# Patient Record
Sex: MALE | Race: WHITE | Employment: OTHER | URBAN - METROPOLITAN AREA
[De-identification: names, ages, dates, MRNs, and addresses within clinical notes are randomized per-mention and may not be internally consistent; named-entity substitution may affect disease eponyms.]

---

## 2020-09-15 ENCOUNTER — APPOINTMENT (OUTPATIENT)
Dept: CT IMAGING | Age: 60
End: 2020-09-15
Payer: COMMERCIAL

## 2020-09-15 ENCOUNTER — HOSPITAL ENCOUNTER (EMERGENCY)
Age: 60
Discharge: HOME | End: 2020-09-15
Attending: EMERGENCY MEDICINE
Payer: COMMERCIAL

## 2020-09-15 VITALS
RESPIRATION RATE: 16 BRPM | WEIGHT: 242 LBS | TEMPERATURE: 97.8 F | OXYGEN SATURATION: 96 % | SYSTOLIC BLOOD PRESSURE: 138 MMHG | BODY MASS INDEX: 32.07 KG/M2 | DIASTOLIC BLOOD PRESSURE: 76 MMHG | HEIGHT: 73 IN | HEART RATE: 72 BPM

## 2020-09-15 LAB
CHP ED QC CHECK: YES
GLUCOSE BLD-MCNC: 185 MG/DL
METER GLUCOSE: 185 MG/DL (ref 74–99)

## 2020-09-15 PROCEDURE — 82962 GLUCOSE BLOOD TEST: CPT

## 2020-09-15 PROCEDURE — 70450 CT HEAD/BRAIN W/O DYE: CPT

## 2020-09-15 PROCEDURE — 6370000000 HC RX 637 (ALT 250 FOR IP): Performed by: EMERGENCY MEDICINE

## 2020-09-15 PROCEDURE — 99283 EMERGENCY DEPT VISIT LOW MDM: CPT

## 2020-09-15 RX ORDER — VALACYCLOVIR HYDROCHLORIDE 500 MG/1
1000 TABLET, FILM COATED ORAL ONCE
Status: COMPLETED | OUTPATIENT
Start: 2020-09-15 | End: 2020-09-15

## 2020-09-15 RX ORDER — CYCLOBENZAPRINE HCL 10 MG
10 TABLET ORAL 3 TIMES DAILY PRN
COMMUNITY

## 2020-09-15 RX ORDER — VALACYCLOVIR HYDROCHLORIDE 1 G/1
1000 TABLET, FILM COATED ORAL 3 TIMES DAILY
Qty: 21 TABLET | Refills: 0 | Status: SHIPPED | OUTPATIENT
Start: 2020-09-15 | End: 2020-09-22

## 2020-09-15 RX ORDER — LOSARTAN POTASSIUM 100 MG/1
100 TABLET ORAL DAILY
COMMUNITY

## 2020-09-15 RX ORDER — PREDNISONE 20 MG/1
80 TABLET ORAL ONCE
Status: COMPLETED | OUTPATIENT
Start: 2020-09-15 | End: 2020-09-15

## 2020-09-15 RX ORDER — PREDNISONE 20 MG/1
80 TABLET ORAL DAILY
Qty: 24 TABLET | Refills: 0 | Status: SHIPPED | OUTPATIENT
Start: 2020-09-15 | End: 2020-09-21

## 2020-09-15 RX ORDER — VALACYCLOVIR HYDROCHLORIDE 500 MG/1
500 TABLET, FILM COATED ORAL 2 TIMES DAILY
COMMUNITY
End: 2020-09-15

## 2020-09-15 RX ORDER — HYDROCODONE BITARTRATE AND ACETAMINOPHEN 5; 325 MG/1; MG/1
1 TABLET ORAL EVERY 6 HOURS PRN
COMMUNITY

## 2020-09-15 RX ADMIN — PREDNISONE 80 MG: 20 TABLET ORAL at 19:26

## 2020-09-15 RX ADMIN — VALACYCLOVIR HYDROCHLORIDE 1000 MG: 500 TABLET, FILM COATED ORAL at 19:27

## 2020-09-16 ASSESSMENT — ENCOUNTER SYMPTOMS
SORE THROAT: 0
SHORTNESS OF BREATH: 0
ABDOMINAL PAIN: 0
BLOOD IN STOOL: 0
DIARRHEA: 0
COUGH: 0
WHEEZING: 0
VOMITING: 0
PHOTOPHOBIA: 0
EYE PAIN: 0
EYE REDNESS: 0
RHINORRHEA: 0
ABDOMINAL DISTENTION: 0
EYE DISCHARGE: 0
SINUS PRESSURE: 0
BACK PAIN: 0
TROUBLE SWALLOWING: 0
NAUSEA: 0

## 2020-09-16 ASSESSMENT — VISUAL ACUITY: OU: 1

## 2020-09-16 NOTE — ED PROVIDER NOTES
JONH Barron is a 62 y.o. male with a PMHx significant for hypertension, hyperlipidemia and type 2 diabetes mellitus who presents with new right facial numbness and weakness. The patient states that starting last night he noticed a weird tingling feeling in the right side of his face. He states that over a few hours. It progressed to numbness. He states that he then noticed that when he was brushing his teeth he was unable to hold water in his mouth to rinse. When the symptoms were still present when he woke up this morning he decided to come in for further evaluation. He reports no other weakness, loss of sensation or other deficits. He states that he has not noticed any changes in his speech. He denies any sick contacts, recent travel or lifestyle changes. He also denies recent trauma, fever, chills, fatigue, HA, dizziness, vision changes, congestion, rhinorrhea, ear pain, sore throat, neck pain, chest pain, palpitations, hx of MI, SOB, cough, wheezing, abdominal pain, N/V/D/C, hematochezia, melena, dysuria, hematuria, generalized weakness and paresthesias. NIH on arrival was 4 (see breakdown below). The patient is currently taking no blood thinners. Tobacco Hx:   reports that he has quit smoking. He has never used smokeless tobacco.    Alcohol Hx:   reports current alcohol use. Illicit Drug Hx:  Reports no history of illicit drug use. The history is provided by the patient. Last Tetanus (if applicable): N/A    Review of Systems   Constitutional: Negative for chills, diaphoresis, fatigue and fever. HENT: Negative for congestion, ear pain, postnasal drip, rhinorrhea, sinus pressure, sore throat and trouble swallowing. Eyes: Negative for photophobia, pain, discharge, redness and visual disturbance. Respiratory: Negative for cough, shortness of breath and wheezing. Cardiovascular: Negative for chest pain, palpitations and leg swelling.    Gastrointestinal: Negative for abdominal distention, abdominal pain, blood in stool, diarrhea, nausea and vomiting. Genitourinary: Negative for difficulty urinating, dysuria, flank pain, frequency and hematuria. Musculoskeletal: Negative for arthralgias, back pain, myalgias and neck pain. Skin: Negative for rash and wound. Neurological: Positive for weakness (Right-sided facial weakness) and numbness (Right-sided facial numbness). Negative for dizziness, speech difficulty, light-headedness and headaches. Hematological: Negative for adenopathy. All other systems reviewed and are negative. Physical Exam  Vitals signs and nursing note reviewed. Constitutional:       General: He is awake. He is not in acute distress. Appearance: He is not diaphoretic. HENT:      Head: Normocephalic and atraumatic. Comments: No abnormal step-offs, hematomas or wounds. There is notable numbness overlying the right face. When asked to referral his brow, there is a notable difference between the right and left side suggesting right-sided weakness. There is moderate right facial droop that is notable at the right corner of the patient's mouth. When asked to puff his cheeks out, the patient is unable to hold an air on the right side. Right Ear: External ear normal.      Left Ear: External ear normal.      Nose: Nose normal. No congestion or rhinorrhea. Mouth/Throat:      Mouth: Mucous membranes are moist.      Pharynx: Oropharynx is clear. No posterior oropharyngeal erythema. Eyes:      General: Vision grossly intact. Gaze aligned appropriately. No visual field deficit or scleral icterus. Right eye: No discharge. Left eye: No discharge. Extraocular Movements: Extraocular movements intact. Right eye: Normal extraocular motion and no nystagmus. Left eye: Normal extraocular motion and no nystagmus. Conjunctiva/sclera: Conjunctivae normal.      Right eye: Right conjunctiva is not injected. Left eye: Left conjunctiva is not injected. Comments: Patient's right eyelid is somewhat weak and appears to droop just slightly lower than the left. Neck:      Musculoskeletal: Normal range of motion and neck supple. No neck rigidity or muscular tenderness. Cardiovascular:      Rate and Rhythm: Normal rate and regular rhythm. Heart sounds: Normal heart sounds. No murmur. No friction rub. No gallop. Comments: Upper extremity and lower extremity distal pulses intact bilaterally +2/4  Pulmonary:      Effort: Pulmonary effort is normal. No respiratory distress. Breath sounds: Normal breath sounds. No wheezing, rhonchi or rales. Chest:      Chest wall: No tenderness. Abdominal:      General: Bowel sounds are normal. There is no distension. Palpations: Abdomen is soft. Tenderness: There is no abdominal tenderness. There is no guarding or rebound. Musculoskeletal: Normal range of motion. General: No tenderness or deformity. Right lower leg: No edema. Left lower leg: No edema. Lymphadenopathy:      Cervical: No cervical adenopathy. Skin:     General: Skin is warm and dry. Capillary Refill: Capillary refill takes less than 2 seconds. Findings: No erythema or rash. Neurological:      General: No focal deficit present. Mental Status: He is alert and oriented to person, place, and time. Sensory: Sensory deficit (Numbness/paresthesia of the right face) present. Motor: Weakness (Right face) present. No abnormal muscle tone. Coordination: Coordination is intact. Coordination normal.      Gait: Gait is intact. NIH Stroke Scale  NIH Stroke Scale Assessed: Yes  Interval: Baseline  Level of Consciousness (1a. ): Alert  LOC Questions (1b. ):  Answers both correctly  LOC Commands (1c. ): Performs both tasks correctly  Best Gaze (2. ): Normal  Visual (3. ): No visual loss  Facial Palsy (4. ): (!) Partial paralysis  Motor Arm, Left (5a. ): No drift  Motor Arm, Right (5b. ): No drift  Motor Leg, Left (6a. ): No drift  Motor Leg, Right (6b. ): No drift  Limb Ataxia (7. ): Absent  Sensory (8. ): (!) Mild to Moderate  Best Language (9. ): No aphasia  Dysarthria (10. ): Mild to moderate dysarthria  Extinction and Inattention (11): No abnormality  Total: 4    ---------------------------------- PAST HISTORY ---------------------------------------------  Past Medical History:  has a past medical history of Diabetes mellitus ([facility] [address] Hyperlipidemia, and Hypertension. Past Surgical History:  has a past surgical history that includes back surgery; Appendectomy; and Cholecystectomy. Social History:  reports that he has quit smoking. He has never used smokeless tobacco. He reports current alcohol use. He reports that he does not use drugs. Family History: family history is not on file. Home Meds: Not in a hospital admission. The patients home medications have been reviewed. Allergies: Sulfa antibiotics    ------------------------- NURSING NOTES AND VITALS REVIEWED ---------------------------  Date / Time Roomed:  9/15/2020  4:43 PM  ED Bed Assignment:  DHIRAJ/DHIRAJ    The nursing notes within the ED encounter and vital signs as below have been reviewed. /76   Pulse 72   Temp 97.8 °F (36.6 °C) (Oral)   Resp 16   Ht 6' 1\" (1.854 m)   Wt 242 lb (109.8 kg)   SpO2 96%   BMI 31.93 kg/m²   -------------------------------------------------- RESULTS / INTERVENTIONS -------------------------------------------------  All laboratory and radiology tests have been reviewed by this physician. LABS:  Results for orders placed or performed during the hospital encounter of 09/15/20   POCT Glucose   Result Value Ref Range    Glucose 185 mg/dL    QC OK? yes    POCT Glucose   Result Value Ref Range    Meter Glucose 185 (H) 74 - 99 mg/dL       RADIOLOGY: Interpreted by Radiologist unless otherwise noted.   CT Head WO Contrast   Final Result   No acute intracranial abnormality. Oxygen Saturation Interpretation: Normal    Meds Given:  Medications   valACYclovir (VALTREX) tablet 1,000 mg (1,000 mg Oral Given 9/15/20 1927)   predniSONE (DELTASONE) tablet 80 mg (80 mg Oral Given 9/15/20 1926)       Procedures:  No procedures performed. --------------------------------- PROGRESS NOTES / ADDITIONAL PROVIDER NOTES ---------------------------------  Consultations:  As outlined below. ED Course:  ED Course as of Sep 16 2127   Tue Sep 15, 2020   1927 On reevaluation, the patient is resting comfortably in bed. I discussed the findings of his work-up and our diagnosis of Bell's palsy. The illness and prognosis were explained. I informed him of the treatment a for it and that we would administer doses of the recommended medications while he was in the department and discharge him with prescriptions for the same. All questions were answered. At this time the patient is stable and safe for discharge home. [ML]      ED Course User Index  [ML] Wojciech Estevez, [place]     2350: All results were discussed with the patient and I have provided specific details regarding the plan of care, diagnosis and associated prognosis. The patient tolerated the visit well and, at the time of discharge, he was without objective evidence of hemodynamic instability or an acute process (biological or psychological) requiring hospitalization and inpatient management. He was seen by myself and the assigned attending physician, Dr. Sada Minaya, who agreed with my assessment and plan as laid out herein. The importance of follow-up was discussed at the end of the visit and I recommended that the patient be seen by his primary care physician in 2-3 days. The patient verbalized his understanding and agreement with the plan as presented and stated his intention to follow up with his PCP by calling to schedule an appointment as soon as possible.  Reasons to return to the ER or seek immediate evaluation by a medical provider were discussed at length and all questions were answered. The patient was discharged home in stable condition. MDM:  Patient presented from home complaining of new onset right facial weakness and numbness. On arrival, the patient was hypertensive with remaining vital signs within normal limits. Physical exam was as documented above. A work-up was initiated to further evaluate the patient's presenting complaints which were suspicious for Bell's palsy. Glucose was within normal limits. Head CT did not reveal any acute intracranial pathology. Given the patient's presenting complaints, his findings on exam and his negative head CT, I felt it was very likely that this was Bell's palsy. This was discussed with the patient and his wife who felt that was most likely as well. Given his overall stability and the lack of acute findings requiring emergent intervention or hospitalization, the decision was made to discharge the patient with recommendation to follow-up with his PCP in 2 to 3 days. The appropriate treatment for Bell's palsy was discussed and doses of prednisone and Valtrex were administered. The patient was stable at the time of his disposition and he was discharged with prescriptions for both prednisone and Valtrex. Discharge Medication List as of 9/15/2020  7:33 PM      START taking these medications    Details   predniSONE (DELTASONE) 20 MG tablet Take 4 tablets by mouth daily for 6 days, Disp-24 tablet,R-0Print             Diagnosis:  1. Bell's palsy        Disposition:  Patient's disposition: Discharge to home  Patient's condition is stable. This patient was seen, examined and treated with Dr. Pato Genao. All aspects of the patient's care were discussed with the attending physician.        Jagjit Mandujano DO  Resident  09/16/20 [id number]

## 2023-09-14 ENCOUNTER — OFFICE VISIT (OUTPATIENT)
Dept: ENT CLINIC | Age: 63
End: 2023-09-14
Payer: OTHER GOVERNMENT

## 2023-09-14 VITALS
DIASTOLIC BLOOD PRESSURE: 82 MMHG | WEIGHT: 242.8 LBS | BODY MASS INDEX: 32.18 KG/M2 | SYSTOLIC BLOOD PRESSURE: 143 MMHG | HEART RATE: 68 BPM | HEIGHT: 73 IN

## 2023-09-14 DIAGNOSIS — H93.12 TINNITUS OF LEFT EAR: ICD-10-CM

## 2023-09-14 DIAGNOSIS — Z01.818 PREOP TESTING: ICD-10-CM

## 2023-09-14 DIAGNOSIS — H91.8X9 ASYMMETRICAL HEARING LOSS: ICD-10-CM

## 2023-09-14 DIAGNOSIS — Z01.818 PREOP TESTING: Primary | ICD-10-CM

## 2023-09-14 LAB
BUN BLDV-MCNC: 14 MG/DL (ref 6–23)
CREAT SERPL-MCNC: 1 MG/DL (ref 0.7–1.2)
GFR SERPL CREATININE-BSD FRML MDRD: >60 ML/MIN/1.73M2

## 2023-09-14 PROCEDURE — 99203 OFFICE O/P NEW LOW 30 MIN: CPT | Performed by: NURSE PRACTITIONER

## 2023-09-14 RX ORDER — TIZANIDINE 4 MG/1
TABLET ORAL
COMMUNITY
Start: 2023-08-16

## 2023-09-14 RX ORDER — EPINEPHRINE 0.3 MG/.3ML
INJECTION SUBCUTANEOUS
COMMUNITY
Start: 2023-03-16

## 2023-09-14 RX ORDER — PANTOPRAZOLE SODIUM 40 MG/1
40 FOR SUSPENSION ORAL
COMMUNITY

## 2023-09-14 ASSESSMENT — ENCOUNTER SYMPTOMS
RESPIRATORY NEGATIVE: 1
STRIDOR: 0
SINUS PRESSURE: 0
RHINORRHEA: 0
SINUS PAIN: 0
SHORTNESS OF BREATH: 0
EYES NEGATIVE: 1

## 2023-10-23 ENCOUNTER — TELEPHONE (OUTPATIENT)
Dept: ENT CLINIC | Age: 63
End: 2023-10-23

## 2023-10-23 NOTE — TELEPHONE ENCOUNTER
Pt called in saying he got MRI results over [id number] gave him hearing aids so he doesnt feel like he needs to come to appt on 10/26/23 and cancelled appt